# Patient Record
Sex: MALE | Race: WHITE | NOT HISPANIC OR LATINO
[De-identification: names, ages, dates, MRNs, and addresses within clinical notes are randomized per-mention and may not be internally consistent; named-entity substitution may affect disease eponyms.]

---

## 2020-10-31 ENCOUNTER — ERX REFILL RESPONSE (OUTPATIENT)
Age: 60
End: 2020-10-31

## 2020-10-31 RX ORDER — AZATHIOPRINE 50 MG/1
TAKE 4 TABLETS BY MOUTH EVERY DAY TABLET ORAL
Qty: 120 | Refills: 4

## 2020-11-25 ENCOUNTER — TELEPHONE ENCOUNTER (OUTPATIENT)
Dept: URBAN - METROPOLITAN AREA CLINIC 98 | Facility: CLINIC | Age: 60
End: 2020-11-25

## 2020-11-25 RX ORDER — PREDNISONE 5 MG/1
2 TABLETS TABLET ORAL ONCE A DAY
Qty: 60 TABLET | Refills: 5 | OUTPATIENT
Start: 2020-11-25 | End: 2021-05-24

## 2020-11-25 RX ORDER — PREDNISONE 5 MG/1
1 TABLET TABLET ORAL ONCE A DAY
Qty: 30 TABLET | Refills: 5 | OUTPATIENT
Start: 2020-11-25 | End: 2021-05-24

## 2020-11-29 ENCOUNTER — ERX REFILL RESPONSE (OUTPATIENT)
Age: 60
End: 2020-11-29

## 2020-11-29 RX ORDER — PREDNISONE 5 MG/1
TAKE TWO TABLETS BY MOUTH DAILY TABLET ORAL
Qty: 60 | Refills: 4

## 2021-05-04 ENCOUNTER — TELEPHONE ENCOUNTER (OUTPATIENT)
Dept: URBAN - METROPOLITAN AREA CLINIC 98 | Facility: CLINIC | Age: 61
End: 2021-05-04

## 2021-05-04 ENCOUNTER — TELEPHONE ENCOUNTER (OUTPATIENT)
Dept: URBAN - METROPOLITAN AREA CLINIC 92 | Facility: CLINIC | Age: 61
End: 2021-05-04

## 2021-05-04 RX ORDER — SULFASALAZINE 500 MG/1
TAKE FOUR TABLETS BY MOUTH THREE TIMES A DAY FOR 90 DAYS TABLET ORAL
Qty: 1080 | Refills: 3
Start: 2019-06-11

## 2021-05-04 RX ORDER — AZATHIOPRINE 50 MG/1
TAKE 4 TABLETS BY MOUTH EVERY DAY TABLET ORAL
Qty: 120 | Refills: 4

## 2021-05-04 RX ORDER — AZATHIOPRINE 50 MG/1
TAKE 4 TABLETS BY MOUTH EVERY DAY TABLET ORAL ONCE DAILY
Qty: 120 TABLETS | Refills: 5
End: 2021-10-31

## 2021-05-08 ENCOUNTER — ERX REFILL RESPONSE (OUTPATIENT)
Dept: URBAN - METROPOLITAN AREA CLINIC 98 | Facility: CLINIC | Age: 61
End: 2021-05-08

## 2021-05-08 ENCOUNTER — ERX REFILL RESPONSE (OUTPATIENT)
Age: 61
End: 2021-05-08

## 2021-05-08 RX ORDER — AZATHIOPRINE 50 MG/1
TAKE FOUR TABLETS BY MOUTH DAILY TABLET ORAL
Qty: 120 | Refills: 2

## 2021-05-08 RX ORDER — SULFASALAZINE 500 MG/1
TAKE FOUR TABLETS BY MOUTH THREE TIMES A DAY FOR 90 DAYS TABLET ORAL
Qty: 1080 TABLET | Refills: 3 | OUTPATIENT

## 2021-05-08 RX ORDER — SULFASALAZINE 500 MG/1
TAKE FOUR TABLETS BY MOUTH THREE TIMES A DAY FOR 90 DAYS TABLET ORAL
Qty: 1080 | Refills: 3 | OUTPATIENT

## 2021-05-09 ENCOUNTER — ERX REFILL RESPONSE (OUTPATIENT)
Age: 61
End: 2021-05-09

## 2021-05-15 ENCOUNTER — ERX REFILL RESPONSE (OUTPATIENT)
Age: 61
End: 2021-05-15

## 2021-05-22 ENCOUNTER — ERX REFILL RESPONSE (OUTPATIENT)
Age: 61
End: 2021-05-22

## 2021-05-25 ENCOUNTER — TELEPHONE ENCOUNTER (OUTPATIENT)
Dept: URBAN - METROPOLITAN AREA CLINIC 98 | Facility: CLINIC | Age: 61
End: 2021-05-25

## 2021-05-25 ENCOUNTER — OFFICE VISIT (OUTPATIENT)
Dept: URBAN - METROPOLITAN AREA CLINIC 98 | Facility: CLINIC | Age: 61
End: 2021-05-25
Payer: COMMERCIAL

## 2021-05-25 DIAGNOSIS — R19.7 DIARRHEA: ICD-10-CM

## 2021-05-25 DIAGNOSIS — K51.80 CHRONIC PANCOLONIC ULCERATIVE COLITIS: ICD-10-CM

## 2021-05-25 DIAGNOSIS — R10.84 GENERALIZED ABDOMINAL PAIN: ICD-10-CM

## 2021-05-25 DIAGNOSIS — Z79.899 IMMUNOSUPPRESSION DUE TO DRUG THERAPY: ICD-10-CM

## 2021-05-25 PROCEDURE — 99214 OFFICE O/P EST MOD 30 MIN: CPT | Performed by: INTERNAL MEDICINE

## 2021-05-25 RX ORDER — SULFASALAZINE 500 MG/1
TAKE 3 TABLETS TABLET ORAL
Qty: 810 TABLETS | Refills: 3 | OUTPATIENT
Start: 2021-05-25 | End: 2022-05-20

## 2021-05-25 RX ORDER — SULFASALAZINE 500 MG/1
TAKE FOUR TABLETS BY MOUTH THREE TIMES A DAY FOR 90 DAYS TABLET ORAL
Qty: 1080 TABLET | Refills: 3 | Status: ACTIVE | COMMUNITY

## 2021-05-25 RX ORDER — PREDNISONE 5 MG/1
TAKE TWO TABLETS BY MOUTH DAILY TABLET ORAL
Qty: 60 | Refills: 4 | Status: ACTIVE | COMMUNITY

## 2021-05-25 RX ORDER — AZATHIOPRINE 50 MG/1
TAKE FOUR TABLETS BY MOUTH DAILY TABLET ORAL
Qty: 120 | Refills: 2 | Status: ACTIVE | COMMUNITY

## 2021-05-25 RX ORDER — PREDNISONE 1 MG/1
2 TABLETS WITH FOOD OR MILK TABLET ORAL ONCE A DAY
Qty: 60 TABLETS | Refills: 1 | OUTPATIENT
Start: 2021-05-25 | End: 2021-07-24

## 2021-05-25 NOTE — HPI-TODAY'S VISIT:
62 yo male with UC comes in for 1.5 year f/u. No diarrhea bleeding or pain.   Doing well.  Had colonoscopy 2/2020 and no f/u since then.  2/2020 colon showed left sided/desc colon inflammation and focal ulceration desc colon.  On pred 2.5 a day. He feels it keeps him stable. Scared to go off it Occasional takes more than 2.5, less than once a month.  On azathioprine 2 in the am, 1.5 at night. Sulfasalazine 3 bid, was up to 6 bid, but they are hard to find.  No colon for a year./2/2020.   ======================= 11/13/19   History Of Present Illness  This is a scheduled appointment for this patient, a 59 year old /White male, after a previous visit on 11/13/2019, for a follow-up evaluation of Ulcerative colitis.     58 yo male with UC comes in for 3 month f/u.  Was on aza 200 mg q am.  Low wbc and hi 6MMP/nl lft, and changed to 100 mg bid.  Still on 2.5 mg pred a day.  Last time he took 5 mg was 2 months ago with travel.  As a precaution.  No pain, diarrhea or bleeding.  Stools are solid.  Last colonoscopy 6/11/19 and will do Jan Feb 2019.      ====== 8/14/19  58 yo male comes in on aza 200 mg a day.  Not doing too bad.  Gets abd pain non-daily.  On 2.5 mg pred a day and 5 mg if pain.  3 stools a day  Blood infrequent.  Abd pain is better since colonoscopy.  Gas trapping symptoms are better.  Labs from July 2019 are wnl ex borderline low wbc and hi 6 MMP 20,000 with nl lft.  ========== 6/11/19  58 yo male with UC comes in for 1 year f/u.  Now on aza 200 mg a day.  Was on Simponi 1 year ago.  Also tried Xeljanz in past year but it did not help.  Takes prednisone 5 mg a day for a few days and then 2.5 mg a day.  Gets gas in colon and it won't come out.  Gas trapping.    ==== June 2019  59 yo male with UC on Simponi 100 mg q month comes in for q 2 month f/u.  On aza 4 tophp=286 mg a day.  Doing well with no pain , no diarrhea, 4 stools a day, no bleeding.  AGWS.  No fcx.  No EIM ex joint = shoulders.  Stuck on pred 7.5 per day and can' t get below for more than a few day. ========================================= 4/12/18 59 yo male comes in with [place] for this 3 month visit.  Has been on 2.5 mg prednisone for a few months and may have to increase to 5-10 mg for a day or two.  For a month or two skipped the steroids for a month or two.  Saw an orhtopedist who says your bone looks good and said there was a little arthritis on his hip.  He said he was not worried about his shoulders.  He was scared to take any prednisone.  His UC flared up, and he started on the prednisone again.  He took 10 mg a day and  took pred 10 and today 7.5mg.  Since end of month he has been tapering.  Got 3 Simponi from me and getting 2 per month.  Decreased aza from 4 to 3 per day due to wbc being low.  started Simponi inearly 2017  ?? past vedo  Currently 6-7 stools a day No blood Colon 12/17  will try a gradual taper of pred      Past Medical History  Disease Name Date Onset Notes  Abdominal pain 9/8/2017 --   Anemia unk --   Diarrhea 04/12/2018 --   Immunosuppression due to drug therapy 06/16/2019 --   Ulcerative colitis unk --       Past Surgical History  Procedure Name Date Notes  Colonoscopy 12/2017 11/13/2019 - 06/14/2018 - 04/12/2018 -   flexible sigmoidoscopy 2013 11/13/2019 - 04/12/2018 -   Vasectomy unk --       Medication List  Name Date Started Instructions  azathioprine 50 mg oral tablet 06/11/2019 take 4 tablets by oral route qd for 90 days  Cipro 500 mg oral tablet 10/06/2017 TAKE ONE TABLET BY MOUTH TWICE A DAY  prednisone 5 mg oral tablet 07/24/2019 TAKE TWO TABLETS BY MOUTH DAILY  sulfasalazine 500 mg oral tablet 06/11/2019 TAKE FOUR TABLETS BY MOUTH THREE TIMES A DAY for 90 days  Sulfazine  mg oral tablet,delayed release (DR/EC) 10/27/2015 TAKE FOUR TABLETS BY MOUTH THREE TIMES A DAY for 90 days  Uceris 9 mg oral tablet,delayed and ext.release 03/21/2017 take 1 tablet (9 mg) by oral route once daily in the morning swallowing whole with water.  Xeljanz 5 mg oral tablet 06/14/2018 take 2 tablets by oral route 2 times a day for 30 days      Allergy List  Allergen Name Date Reaction Notes  No Known Environmental Allergies --  --  --   No Known Food Allergies --  --  --   PENICILLINS --  --  --       Family Medical History  Disease Name Relative/Age Notes  Family history of diabetes Father/  --       Social History  Finding Status Start/Stop Quantity Notes  Alcohol Never --/-- --  11/13/2019 - 08/14/2019 - 06/06/2019 - 06/14/2018 - 04/12/2018 - 01/12/2018 - 09/08/2017 - 07/07/2017 - 03/06/2017 -   Denies illicit substance abuse --  --/-- --  11/13/2019 -   Tobacco Former --/-- --  11/13/2019 - 06/06/2019 - 06/14/2018 - 04/12/2018 - 01/12/2018 - 09/08/2017 - 07/07/2017 - 03/06/2017 - 09/02/2016 -       Review of Systems  ConstitutionalDenies : body aches, chills, fatigue, fever, loss of appetite, malaise, night sweats, weight gain, weight loss  EyesDenies : blurred vision, visual changes  HENTDenies : Ear Pain/Ringing, hearing loss, Mouth Ulcers/Sores, nose bleeds, problems with gums/teeth, trouble swallowing  CardiovascularDenies : chest pain, leaky heart valves, heart murmur, heart racing/skipping, high blood pressure, palpitations  RespiratoryDenies : chronic cough, shortness of breath, wheezing or asthma symptoms  GastrointestinalDenies : Abdominal Pain/discomfort, Anal/Rectal Pain or Itching, Anal Spasm, Black Stool, Bloating/belching/gaseouness, Change of Bowel Habit, Constipation, Diarrhea/Loose Stool, Difficulty in Swallowing, Heartburn/esophageal reflux, Hemorrhoids, Indigestion, Mucus in Stool, Nausea/vomiting, Rectal Bleeding, Unintentional Weight Loss  GenitourinaryDenies : Blood in Urine, Burning/pain with Urination, frequency, Recent/frequent UTI, Kidney Stones  IntegumentDenies : itching/dry skin, jaundice, rashes, bumps or sores  NeurologicDenies : headaches, dizziness/vertigo, head trauma/injury, recent numbness/weakness, seizures  MusculoskeletalDenies : back pain, decreased range of motion, joint pain/arthritis, Problems Walking/calf or leg pain  EndocrineDenies : bruise easily, excessive thirst, heat/cold intolerance, history of high or low blood sugar  PsychiatricDenies : anxiety, changes in sleep pattern, depression, loss of memory  Heme-LymphDenies : Bleeding Problems, Enlarged Nodes/Swolen Glands, excessive bruising, history of anemia  Allergic-ImmunologicDenies : seasonal allergies    Vitals  Date Time BP Position Site L\R Cuff Size HR RR TEMP (F) WT  HT  BMI kg/m2 BSA m2 O2 Sat HC     11/13/2019 03:26 /83 Sitting    72 - R  97.4 181lbs 2oz 5'  8" 27.54 1.99        Physical Examination  ConstitutionalAppearance : Well nourished, well developed patient in no distress. Ambulating without difficulty.  Ability to Communicate : Normal communication ability  EyesConjunctivae and Eyelids : Conjunctivae and eyelids appear normal  Sclerae : White without injection  HENTHead :  Inspection : Normocephalic and atraumatic  Face :  Inspection : Face within normal limits  Ears :  External Ears : External ears within normal limits  Nose/Nasopharynx :  External Nose : External nose normal appearance, nares patent, no nasal discharge  Mouth and Throat :  General : Oral cavity appearance normal, breath odor normal  Lips : Appearance normal  NeckInspection and Palpation : Normal appearance without tenderness on palpation or deformities, trachea midline  Thyroid : Normal size without tenderness, nodules or masses  Jugular Veins : no JVD  ChestInspection of Chest : No lesions, deformities or traumatic injuries present. No significant scars are present.  Respiratory Effort : Breathing is unlabored without accessory muscle use  Palpation of Chest : Chest wall non-tender with no masses present. Tactile fremitus is normal  Auscultation : Normal breath sounds  CardiovascularHeart :  Auscultation : Heart rate is regular with normal rhythm. No murmurs are heard. No edema.  GastrointestinalAbdominal Exam : Abdomen soft without rigidity or guarding, abdomen non-tender to palpation, normal bowel sounds, no masses present, non-distended  Liver and Spleen : No hepatomegaly present. Liver is non-tender to palpation and spleen is not palpable.  LymphaticNeck : No lymphadenopathy present  Axillae : No lymphadenopathy present  Groin : No lymphadenopathy present  MusculoskeletalGait and Station : Normal Gait, normal station  Neck/Spine/Pelvis : No tenderness of deformities present.  SkinGeneral Inspection : No rashes, lesions or areas of discoloration present. Skin turgor is normal.  General Palpation : No abnormalities, masses or tenderness on palpation.  Neurologic and PsychiatricOrientation : Oriented to person, place and time  Sensation : Normal sensation  Memory : Short and long term memory intact.  Mood and Affect : Normal mood with an appropriate affect      Assessment  Ulcerative colitis     556.6  Immunosuppression due to drug therapy     V58.69/Z79.899  Abdominal pain     789.00/R10.9    Plan  OrdersPatient not identified as an unhealthy alcohol user when screene () - - 11/13/2019  Influenza immunization administered or previously received () - - 11/13/2019  BMI is documented within normal parameters and no follow-up plan () - - 11/13/2019  Current tobacco non-user (CAD, cap, COPD, PV) (dm) (IBD) (1036F, ) - - 11/13/2019  Colorectal cancer screening results documented and reviewed (PV) (3017F) - - 11/13/2019  Documentation of current medications. () - - 11/13/2019  CMP (comprehensive metabolic panel) (10018) - - 11/13/2019  Sed rate (79344) - - 11/13/2019  C-reactive protein (90241) - - 11/13/2019  CBC with diff (47716) - - 11/13/2019  Thiopurine metabolites measurement. (54966, 70094) - - 11/13/2019  MedicationsMedications have been Reconciled  Transition of Care or Provider Policy  InstructionsI have documented a list of current medications and reviewed it with the patient.  I encouraged the patient to diet and exercise.  Electronically Identified Patient Education Materials Provided Electronically  DispositionReturn To Clinic in 6 Months  CorrespondenceCC this document (Arturo Velázquez MD) - 11/13/2019    Try stopping pred or taper by 1 mg  consdier uceris if needed.  ck labs and thiopurines.  Colonoscopy in 2-3 months.  he mentions on antibiotic "clindamycin" for "wound" and take a probiotic  Gave him 12 days of bid vsl3 probiotic  Addendum--11/25/19 6TG 405 and 6MMP of 47445 Will reduce aza from 100 bid to 100 in am and 75 in pm and recheck labs in 1 month         Electronically Signed by: Gerardo Zabala MD -Author on November 25, 2019 02:15:43 AM

## 2021-05-26 ENCOUNTER — ERX REFILL RESPONSE (OUTPATIENT)
Age: 61
End: 2021-05-26

## 2021-05-29 ENCOUNTER — ERX REFILL RESPONSE (OUTPATIENT)
Age: 61
End: 2021-05-29

## 2021-05-29 RX ORDER — SULFASALAZINE 500 MG/1
TAKE 3 TABLETS 3 TIMES A DAY TABLET ORAL
Qty: 810 | Refills: 12 | OUTPATIENT

## 2021-05-29 RX ORDER — SULFASALAZINE 500 MG/1
TAKE 3 TABLETS 3 TIMES A DAY TABLET ORAL
Qty: 810 TABLET | Refills: 12 | OUTPATIENT

## 2021-06-08 LAB
6-MMPN: 9841
6-TGN: 244
A/G RATIO: 1.6
ALBUMIN: 4.2
ALKALINE PHOSPHATASE: 66
ALT (SGPT): 8
AST (SGOT): 22
BASO (ABSOLUTE): 0
BASOS: 1
BILIRUBIN, TOTAL: 0.6
BUN/CREATININE RATIO: 19
BUN: 16
C-REACTIVE PROTEIN, QUANT: 1
CALCIUM: 9.2
CARBON DIOXIDE, TOTAL: 26
CHLORIDE: 105
CREATININE: 0.85
EGFR IF AFRICN AM: 107
EGFR IF NONAFRICN AM: 93
EOS (ABSOLUTE): 0
EOS: 1
FERRITIN, SERUM: 69
FOLATE (FOLIC ACID), SERUM: >20
GLOBULIN, TOTAL: 2.6
GLUCOSE: 92
HEMATOCRIT: 48.1
HEMATOLOGY COMMENTS:: (no result)
HEMOGLOBIN: 15.7
IMMATURE CELLS: (no result)
IMMATURE GRANS (ABS): 0
IMMATURE GRANULOCYTES: 1
IRON BIND.CAP.(TIBC): 282
IRON SATURATION: 43
IRON: 120
LYMPHS (ABSOLUTE): 0.8
LYMPHS: 23
MCH: 31.6
MCHC: 32.6
MCV: 97
MONOCYTES(ABSOLUTE): 0.5
MONOCYTES: 14
NEUTROPHILS (ABSOLUTE): 2.2
NEUTROPHILS: 60
NRBC: (no result)
PLATELETS: 213
POTASSIUM: 4.2
PROTEIN, TOTAL: 6.8
RBC: 4.97
RDW: 14.2
SEDIMENTATION RATE-WESTERGREN: 15
SODIUM: 145
UIBC: 162
VITAMIN B12: 275
VITAMIN D, 25-HYDROXY: 24.7
WBC: 3.7

## 2021-06-10 ENCOUNTER — TELEPHONE ENCOUNTER (OUTPATIENT)
Dept: URBAN - METROPOLITAN AREA CLINIC 98 | Facility: CLINIC | Age: 61
End: 2021-06-10

## 2021-06-10 RX ORDER — ERGOCALCIFEROL 1.25 MG/1
1 CAPSULE CAPSULE, LIQUID FILLED ORAL
Qty: 4 CAPSULES | Refills: 5 | OUTPATIENT
Start: 2021-06-10 | End: 2021-12-06

## 2021-08-07 ENCOUNTER — ERX REFILL RESPONSE (OUTPATIENT)
Dept: URBAN - METROPOLITAN AREA CLINIC 98 | Facility: CLINIC | Age: 61
End: 2021-08-07

## 2021-08-07 RX ORDER — PREDNISONE 1 MG/1
TAKE 2 TABLETS WIH FOOD OR MILK ONCE A DAY***MAY TAPER BY 1/2 TABLET PER MONTH** TABLET ORAL
Qty: 60 TABLET | Refills: 2 | OUTPATIENT

## 2021-08-11 ENCOUNTER — TELEPHONE ENCOUNTER (OUTPATIENT)
Dept: URBAN - METROPOLITAN AREA CLINIC 98 | Facility: CLINIC | Age: 61
End: 2021-08-11

## 2021-08-11 RX ORDER — PREDNISONE 1 MG/1
2 TABLETS WITH FOOD OR MILK TABLET ORAL ONCE A DAY
Qty: 60 TABLETS | Refills: 5 | OUTPATIENT
Start: 2021-08-11 | End: 2022-02-06

## 2021-09-18 ENCOUNTER — ERX REFILL RESPONSE (OUTPATIENT)
Dept: URBAN - METROPOLITAN AREA CLINIC 98 | Facility: CLINIC | Age: 61
End: 2021-09-18

## 2021-09-18 RX ORDER — AZATHIOPRINE 50 MG/1
TAKE 4 TABLETS BY MOUTH EVERY DAY ONCE DAILY FOR 30 DAYS TABLET ORAL
Qty: 120 TABLET | Refills: 6 | OUTPATIENT

## 2021-09-18 RX ORDER — AZATHIOPRINE 50 MG/1
TAKE FOUR TABLETS BY MOUTH DAILY TABLET ORAL
Qty: 120 | Refills: 2 | OUTPATIENT

## 2021-10-09 ENCOUNTER — ERX REFILL RESPONSE (OUTPATIENT)
Dept: URBAN - METROPOLITAN AREA CLINIC 98 | Facility: CLINIC | Age: 61
End: 2021-10-09

## 2021-10-09 RX ORDER — PREDNISONE 1 MG/1
TAKE 2 TABLETS WIH FOOD OR MILK ONCE A DAY***MAY TAPER BY 1/2 TABLET PER MONTH** TABLET ORAL
Qty: 60 TABLET | Refills: 2 | OUTPATIENT

## 2021-10-09 RX ORDER — PREDNISONE 1 MG/1
TAKE 2 TABLETS WIH FOOD OR MILK ONCE A DAY***MAY TAPER BY 1/2 TABLET PER MONTH** 30 TABLET ORAL
Qty: 60 TABLET | Refills: 2 | OUTPATIENT

## 2021-11-30 ENCOUNTER — TELEPHONE ENCOUNTER (OUTPATIENT)
Dept: URBAN - METROPOLITAN AREA CLINIC 98 | Facility: CLINIC | Age: 61
End: 2021-11-30

## 2021-11-30 RX ORDER — PREDNISONE 1 MG/1
2 TABLETS WITH FOOD OR MILK TABLET ORAL ONCE A DAY
Qty: 60 TABLETS | Refills: 5 | OUTPATIENT
Start: 2021-11-30 | End: 2022-05-29

## 2022-01-13 ENCOUNTER — ERX REFILL RESPONSE (OUTPATIENT)
Dept: URBAN - METROPOLITAN AREA CLINIC 98 | Facility: CLINIC | Age: 62
End: 2022-01-13

## 2022-01-13 RX ORDER — PREDNISONE 1 MG/1
TAKE 2 TABLETS WIH FOOD OR MILK ONCE A DAY***MAY TAPER BY 1/2 TABLET PER MONTH** 30 TABLET ORAL
Qty: 60 TABLET | Refills: 2 | OUTPATIENT

## 2022-01-13 RX ORDER — PREDNISONE 1 MG/1
TAKE 2 TABLETS WIH FOOD OR MILK ONCE A DAY***MAY TAPER BY 1/2 TABLET PER MONTH** 30 TABLET ORAL
Qty: 60 TABLET | Refills: 1 | OUTPATIENT

## 2022-01-13 RX ORDER — AZATHIOPRINE 50 MG/1
TAKE 4 TABLETS BY MOUTH EVERY DAY ONCE DAILY FOR 30 DAYS TABLET ORAL
Qty: 360 TABLET | Refills: 2 | OUTPATIENT

## 2022-04-29 ENCOUNTER — OFFICE VISIT (OUTPATIENT)
Dept: URBAN - METROPOLITAN AREA CLINIC 98 | Facility: CLINIC | Age: 62
End: 2022-04-29

## 2022-05-12 ENCOUNTER — WEB ENCOUNTER (OUTPATIENT)
Dept: URBAN - METROPOLITAN AREA CLINIC 98 | Facility: CLINIC | Age: 62
End: 2022-05-12

## 2022-05-12 ENCOUNTER — OFFICE VISIT (OUTPATIENT)
Dept: URBAN - METROPOLITAN AREA CLINIC 98 | Facility: CLINIC | Age: 62
End: 2022-05-12
Payer: COMMERCIAL

## 2022-05-12 DIAGNOSIS — K51.80 CHRONIC PANCOLONIC ULCERATIVE COLITIS: ICD-10-CM

## 2022-05-12 DIAGNOSIS — R19.7 DIARRHEA: ICD-10-CM

## 2022-05-12 DIAGNOSIS — R10.84 ABDOMINAL CRAMPING, GENERALIZED: ICD-10-CM

## 2022-05-12 DIAGNOSIS — Z79.899 IMMUNOSUPPRESSION DUE TO DRUG THERAPY: ICD-10-CM

## 2022-05-12 PROCEDURE — 99214 OFFICE O/P EST MOD 30 MIN: CPT | Performed by: INTERNAL MEDICINE

## 2022-05-12 RX ORDER — SULFASALAZINE 500 MG/1
TAKE 3 TABLETS 3 TIMES A DAY TABLET ORAL
Qty: 810 TABLET | Refills: 12 | Status: ACTIVE | COMMUNITY

## 2022-05-12 RX ORDER — PREDNISONE 5 MG/1
TAKE TWO TABLETS BY MOUTH DAILY TABLET ORAL
Qty: 60 | Refills: 4 | Status: ACTIVE | COMMUNITY

## 2022-05-12 RX ORDER — PREDNISONE 1 MG/1
TAKE 2 TABLETS WIH FOOD OR MILK ONCE A DAY***MAY TAPER BY 1/2 TABLET PER MONTH** 30 TABLET ORAL
Qty: 60 TABLET | Refills: 1 | Status: ACTIVE | COMMUNITY

## 2022-05-12 RX ORDER — AZATHIOPRINE 50 MG/1
TAKE 4 TABLETS BY MOUTH EVERY DAY ONCE DAILY FOR 30 DAYS TABLET ORAL
Qty: 360 TABLET | Refills: 2 | Status: ACTIVE | COMMUNITY

## 2022-05-12 NOTE — PHYSICAL EXAM HENT:
From: Jase Mora  To: Ant Cardenas NIKI Germain MD]    Preferred pharmacy: [place] [id number], [address] [facility].     Comment: Head, normocephalic, atraumatic, Face, Face within normal limits

## 2022-05-12 NOTE — HPI-TODAY'S VISIT:
63 yo male doing well for UC. 1 year f/u. Takes 1 mg prednison and may go up to 2 mg a day. No ER visits. 2 doses Covid vax.     ========================= 5/25/21  62 yo male with UC comes in for 1.5 year f/u. No diarrhea bleeding or pain.   Doing well.  Had colonoscopy 2/2020 and no f/u since then.  2/2020 colon showed left sided/desc colon inflammation and focal ulceration desc colon.  On pred 2.5 a day. He feels it keeps him stable. Scared to go off it Occasional takes more than 2.5, less than once a month.  On azathioprine 2 in the am, 1.5 at night. Sulfasalazine 3 bid, was up to 6 bid, but they are hard to find.  No colon for a year./2/2020.   ======================= 11/13/19   History Of Present Illness  This is a scheduled appointment for this patient, a 59 year old /White male, after a previous visit on 11/13/2019, for a follow-up evaluation of Ulcerative colitis.     60 yo male with UC comes in for 3 month f/u.  Was on aza 200 mg q am.  Low wbc and hi 6MMP/nl lft, and changed to 100 mg bid.  Still on 2.5 mg pred a day.  Last time he took 5 mg was 2 months ago with travel.  As a precaution.  No pain, diarrhea or bleeding.  Stools are solid.  Last colonoscopy 6/11/19 and will do Jan Feb 2019.      ====== 8/14/19  60 yo male comes in on aza 200 mg a day.  Not doing too bad.  Gets abd pain non-daily.  On 2.5 mg pred a day and 5 mg if pain.  3 stools a day  Blood infrequent.  Abd pain is better since colonoscopy.  Gas trapping symptoms are better.  Labs from July 2019 are wnl ex borderline low wbc and hi 6 MMP 20,000 with nl lft.  ========== 6/11/19  60 yo male with UC comes in for 1 year f/u.  Now on aza 200 mg a day.  Was on Simponi 1 year ago.  Also tried Xeljanz in past year but it did not help.  Takes prednisone 5 mg a day for a few days and then 2.5 mg a day.  Gets gas in colon and it won't come out.  Gas trapping.    ==== June 2019  57 yo male with UC on Simponi 100 mg q month comes in for q 2 month f/u.  On aza 4 lafyz=126 mg a day.  Doing well with no pain , no diarrhea, 4 stools a day, no bleeding.  AGWS.  No fcx.  No EIM ex joint = shoulders.  Stuck on pred 7.5 per day and can' t get below for more than a few day. ========================================= 4/12/18 57 yo male comes in with [place] for this 3 month visit.  Has been on 2.5 mg prednisone for a few months and may have to increase to 5-10 mg for a day or two.  For a month or two skipped the steroids for a month or two.  Saw an orhtopedist who says your bone looks good and said there was a little arthritis on his hip.  He said he was not worried about his shoulders.  He was scared to take any prednisone.  His UC flared up, and he started on the prednisone again.  He took 10 mg a day and  took pred 10 and today 7.5mg.  Since end of month he has been tapering.  Got 3 Simponi from me and getting 2 per month.  Decreased aza from 4 to 3 per day due to wbc being low.  started Simponi inearly 2017  ?? past vedo  Currently 6-7 stools a day No blood Colon 12/17  will try a gradual taper of pred      Past Medical History  Disease Name Date Onset Notes  Abdominal pain 9/8/2017 --   Anemia unk --   Diarrhea 04/12/2018 --   Immunosuppression due to drug therapy 06/16/2019 --   Ulcerative colitis unk --       Past Surgical History  Procedure Name Date Notes  Colonoscopy 12/2017 11/13/2019 - 06/14/2018 - 04/12/2018 -   flexible sigmoidoscopy 2013 11/13/2019 - 04/12/2018 -   Vasectomy unk --       Medication List  Name Date Started Instructions  azathioprine 50 mg oral tablet 06/11/2019 take 4 tablets by oral route qd for 90 days  Cipro 500 mg oral tablet 10/06/2017 TAKE ONE TABLET BY MOUTH TWICE A DAY  prednisone 5 mg oral tablet 07/24/2019 TAKE TWO TABLETS BY MOUTH DAILY  sulfasalazine 500 mg oral tablet 06/11/2019 TAKE FOUR TABLETS BY MOUTH THREE TIMES A DAY for 90 days  Sulfazine  mg oral tablet,delayed release (DR/EC) 10/27/2015 TAKE FOUR TABLETS BY MOUTH THREE TIMES A DAY for 90 days  Uceris 9 mg oral tablet,delayed and ext.release 03/21/2017 take 1 tablet (9 mg) by oral route once daily in the morning swallowing whole with water.  Xeljanz 5 mg oral tablet 06/14/2018 take 2 tablets by oral route 2 times a day for 30 days      Allergy List  Allergen Name Date Reaction Notes  No Known Environmental Allergies --  --  --   No Known Food Allergies --  --  --   PENICILLINS --  --  --       Family Medical History  Disease Name Relative/Age Notes  Family history of diabetes Father/  --       Social History  Finding Status Start/Stop Quantity Notes  Alcohol Never --/-- --  11/13/2019 - 08/14/2019 - 06/06/2019 - 06/14/2018 - 04/12/2018 - 01/12/2018 - 09/08/2017 - 07/07/2017 - 03/06/2017 -   Denies illicit substance abuse --  --/-- --  11/13/2019 -   Tobacco Former --/-- --  11/13/2019 - 06/06/2019 - 06/14/2018 - 04/12/2018 - 01/12/2018 - 09/08/2017 - 07/07/2017 - 03/06/2017 - 09/02/2016 -       Review of Systems  ConstitutionalDenies : body aches, chills, fatigue, fever, loss of appetite, malaise, night sweats, weight gain, weight loss  EyesDenies : blurred vision, visual changes  HENTDenies : Ear Pain/Ringing, hearing loss, Mouth Ulcers/Sores, nose bleeds, problems with gums/teeth, trouble swallowing  CardiovascularDenies : chest pain, leaky heart valves, heart murmur, heart racing/skipping, high blood pressure, palpitations  RespiratoryDenies : chronic cough, shortness of breath, wheezing or asthma symptoms  GastrointestinalDenies : Abdominal Pain/discomfort, Anal/Rectal Pain or Itching, Anal Spasm, Black Stool, Bloating/belching/gaseouness, Change of Bowel Habit, Constipation, Diarrhea/Loose Stool, Difficulty in Swallowing, Heartburn/esophageal reflux, Hemorrhoids, Indigestion, Mucus in Stool, Nausea/vomiting, Rectal Bleeding, Unintentional Weight Loss  GenitourinaryDenies : Blood in Urine, Burning/pain with Urination, frequency, Recent/frequent UTI, Kidney Stones  IntegumentDenies : itching/dry skin, jaundice, rashes, bumps or sores  NeurologicDenies : headaches, dizziness/vertigo, head trauma/injury, recent numbness/weakness, seizures  MusculoskeletalDenies : back pain, decreased range of motion, joint pain/arthritis, Problems Walking/calf or leg pain  EndocrineDenies : bruise easily, excessive thirst, heat/cold intolerance, history of high or low blood sugar  PsychiatricDenies : anxiety, changes in sleep pattern, depression, loss of memory  Heme-LymphDenies : Bleeding Problems, Enlarged Nodes/Swolen Glands, excessive bruising, history of anemia  Allergic-ImmunologicDenies : seasonal allergies    Vitals  Date Time BP Position Site L\R Cuff Size HR RR TEMP (F) WT  HT  BMI kg/m2 BSA m2 O2 Sat      11/13/2019 03:26 /83 Sitting    72 - R  97.4 181lbs 2oz 5'  8" 27.54 1.99        Physical Examination  ConstitutionalAppearance : Well nourished, well developed patient in no distress. Ambulating without difficulty.  Ability to Communicate : Normal communication ability  EyesConjunctivae and Eyelids : Conjunctivae and eyelids appear normal  Sclerae : White without injection  HENTHead :  Inspection : Normocephalic and atraumatic  Face :  Inspection : Face within normal limits  Ears :  External Ears : External ears within normal limits  Nose/Nasopharynx :  External Nose : External nose normal appearance, nares patent, no nasal discharge  Mouth and Throat :  General : Oral cavity appearance normal, breath odor normal  Lips : Appearance normal  NeckInspection and Palpation : Normal appearance without tenderness on palpation or deformities, trachea midline  Thyroid : Normal size without tenderness, nodules or masses  Jugular Veins : no JVD  ChestInspection of Chest : No lesions, deformities or traumatic injuries present. No significant scars are present.  Respiratory Effort : Breathing is unlabored without accessory muscle use  Palpation of Chest : Chest wall non-tender with no masses present. Tactile fremitus is normal  Auscultation : Normal breath sounds  CardiovascularHeart :  Auscultation : Heart rate is regular with normal rhythm. No murmurs are heard. No edema.  GastrointestinalAbdominal Exam : Abdomen soft without rigidity or guarding, abdomen non-tender to palpation, normal bowel sounds, no masses present, non-distended  Liver and Spleen : No hepatomegaly present. Liver is non-tender to palpation and spleen is not palpable.  LymphaticNeck : No lymphadenopathy present  Axillae : No lymphadenopathy present  Groin : No lymphadenopathy present  MusculoskeletalGait and Station : Normal Gait, normal station  Neck/Spine/Pelvis : No tenderness of deformities present.  SkinGeneral Inspection : No rashes, lesions or areas of discoloration present. Skin turgor is normal.  General Palpation : No abnormalities, masses or tenderness on palpation.  Neurologic and PsychiatricOrientation : Oriented to person, place and time  Sensation : Normal sensation  Memory : Short and long term memory intact.  Mood and Affect : Normal mood with an appropriate affect      Assessment  Ulcerative colitis     556.6  Immunosuppression due to drug therapy     V58.69/Z79.899  Abdominal pain     789.00/R10.9    Plan  OrdersPatient not identified as an unhealthy alcohol user when screene () - - 11/13/2019  Influenza immunization administered or previously received () - - 11/13/2019  BMI is documented within normal parameters and no follow-up plan () - - 11/13/2019  Current tobacco non-user (CAD, cap, COPD, PV) (dm) (IBD) (1036F, ) - - 11/13/2019  Colorectal cancer screening results documented and reviewed (PV) (3017F) - - 11/13/2019  Documentation of current medications. () - - 11/13/2019  CMP (comprehensive metabolic panel) (77042) - - 11/13/2019  Sed rate (76029) - - 11/13/2019  C-reactive protein (06077) - - 11/13/2019  CBC with diff (66365) - - 11/13/2019  Thiopurine metabolites measurement. (87575, 52479) - - 11/13/2019  MedicationsMedications have been Reconciled  Transition of Care or Provider Policy  InstructionsI have documented a list of current medications and reviewed it with the patient.  I encouraged the patient to diet and exercise.  Electronically Identified Patient Education Materials Provided Electronically  DispositionReturn To Clinic in 6 Months  CorrespondenceCC this document (Arturo Velázquez MD) - 11/13/2019    Try stopping pred or taper by 1 mg  consdier uceris if needed.  ck labs and thiopurines.  Colonoscopy in 2-3 months.  he mentions on antibiotic "clindamycin" for "wound" and take a probiotic  Gave him 12 days of bid vsl3 probiotic  Addendum--11/25/19 6TG 405 and 6MMP of 22530 Will reduce aza from 100 bid to 100 in am and 75 in pm and recheck labs in 1 month         Electronically Signed by: Gerardo Zabala MD -Author on November 25, 2019 02:15:43 AM Waking up in the middle of the night severe pain in his feet. Once or twice a month. Has been going on for four months.

## 2022-05-13 LAB
A/G RATIO: 1.9
ALBUMIN: 4.3
ALKALINE PHOSPHATASE: 65
ALT (SGPT): 11
AST (SGOT): 20
BASO (ABSOLUTE): 0
BASOS: 1
BILIRUBIN, TOTAL: 0.7
BUN/CREATININE RATIO: 19
BUN: 15
C-REACTIVE PROTEIN, QUANT: <1
CALCIUM: 9.5
CARBON DIOXIDE, TOTAL: 26
CHLORIDE: 103
CREATININE: 0.78
EGFR: 100
EOS (ABSOLUTE): 0.1
EOS: 2
FERRITIN, SERUM: 76
FOLATE (FOLIC ACID), SERUM: >20
GLOBULIN, TOTAL: 2.3
GLUCOSE: 101
HEMATOCRIT: 49.1
HEMATOLOGY COMMENTS:: (no result)
HEMOGLOBIN: 15.9
IMMATURE CELLS: (no result)
IMMATURE GRANS (ABS): 0
IMMATURE GRANULOCYTES: 1
IRON BIND.CAP.(TIBC): 307
IRON SATURATION: 41
IRON: 125
LYMPHS (ABSOLUTE): 0.7
LYMPHS: 19
MCH: 31.2
MCHC: 32.4
MCV: 96
MONOCYTES(ABSOLUTE): 0.6
MONOCYTES: 15
NEUTROPHILS (ABSOLUTE): 2.3
NEUTROPHILS: 62
NRBC: (no result)
PLATELETS: 197
POTASSIUM: 4.6
PROTEIN, TOTAL: 6.6
RBC: 5.1
RDW: 13.3
SEDIMENTATION RATE-WESTERGREN: 12
SODIUM: 142
UIBC: 182
VITAMIN B12: 336
VITAMIN D, 25-HYDROXY: 26.4
WBC: 3.6

## 2022-05-16 ENCOUNTER — TELEPHONE ENCOUNTER (OUTPATIENT)
Dept: URBAN - METROPOLITAN AREA CLINIC 98 | Facility: CLINIC | Age: 62
End: 2022-05-16

## 2022-05-16 RX ORDER — ERGOCALCIFEROL 1.25 MG/1
1 CAPSULE CAPSULE, LIQUID FILLED ORAL
Qty: 4 CAPSULES | Refills: 5 | OUTPATIENT
Start: 2022-05-16 | End: 2022-11-11

## 2022-05-19 ENCOUNTER — TELEPHONE ENCOUNTER (OUTPATIENT)
Dept: URBAN - METROPOLITAN AREA CLINIC 98 | Facility: CLINIC | Age: 62
End: 2022-05-19

## 2022-05-19 LAB
CALPROTECTIN, FECAL: 36
LACTOFERRIN, FECAL, QUANT.: 2.4

## 2022-05-19 RX ORDER — AZATHIOPRINE 50 MG/1
TAKE 4 TABLETS BY MOUTH EVERY DAY ONCE DAILY FOR 90 DAYS TABLET ORAL
Qty: 360 TABLETS | Refills: 1

## 2022-05-21 ENCOUNTER — ERX REFILL RESPONSE (OUTPATIENT)
Dept: URBAN - METROPOLITAN AREA CLINIC 98 | Facility: CLINIC | Age: 62
End: 2022-05-21

## 2022-05-21 RX ORDER — AZATHIOPRINE 50 1/1
TAKE FOUR TABLETS BY MOUTH DAILY AS DIRECTED TABLET ORAL
Qty: 120 TABLET | Refills: 1 | OUTPATIENT

## 2022-05-21 RX ORDER — AZATHIOPRINE 50 MG/1
TAKE 4 TABLETS BY MOUTH EVERY DAY ONCE DAILY FOR 90 DAYS TABLET ORAL
Qty: 360 TABLETS | Refills: 1 | OUTPATIENT

## 2022-05-21 RX ORDER — AZATHIOPRINE 50 MG/1
TAKE 4 TABLETS BY MOUTH DAILY TABLET ORAL
Qty: 120 TABLET | Refills: 1 | OUTPATIENT

## 2022-06-15 ENCOUNTER — P2P PATIENT RECORD (OUTPATIENT)
Age: 62
End: 2022-06-15

## 2022-07-26 ENCOUNTER — OFFICE VISIT (OUTPATIENT)
Dept: URBAN - METROPOLITAN AREA SURGERY CENTER 18 | Facility: SURGERY CENTER | Age: 62
End: 2022-07-26
Payer: COMMERCIAL

## 2022-07-26 ENCOUNTER — CLAIMS CREATED FROM THE CLAIM WINDOW (OUTPATIENT)
Dept: URBAN - METROPOLITAN AREA CLINIC 4 | Facility: CLINIC | Age: 62
End: 2022-07-26
Payer: COMMERCIAL

## 2022-07-26 DIAGNOSIS — K63.89 OTHER SPECIFIED DISEASES OF INTESTINE: ICD-10-CM

## 2022-07-26 DIAGNOSIS — K51.80 OTHER ULCERATIVE COLITIS WITHOUT COMPLICATIONS: ICD-10-CM

## 2022-07-26 DIAGNOSIS — D12.8 ADENOMATOUS POLYP OF RECTUM: ICD-10-CM

## 2022-07-26 DIAGNOSIS — D12.8 BENIGN NEOPLASM OF RECTUM: ICD-10-CM

## 2022-07-26 DIAGNOSIS — K51.00 ACUTE ULCERATIVE PANCOLITIS: ICD-10-CM

## 2022-07-26 PROCEDURE — 88305 TISSUE EXAM BY PATHOLOGIST: CPT | Performed by: PATHOLOGY

## 2022-07-26 PROCEDURE — 45380 COLONOSCOPY AND BIOPSY: CPT | Performed by: INTERNAL MEDICINE

## 2022-07-26 PROCEDURE — G8907 PT DOC NO EVENTS ON DISCHARG: HCPCS | Performed by: INTERNAL MEDICINE

## 2022-07-26 RX ORDER — ERGOCALCIFEROL 1.25 MG/1
1 CAPSULE CAPSULE, LIQUID FILLED ORAL
Qty: 4 CAPSULES | Refills: 5 | Status: ACTIVE | COMMUNITY
Start: 2022-05-16 | End: 2022-11-11

## 2022-07-26 RX ORDER — SULFASALAZINE 500 MG/1
TAKE 3 TABLETS 3 TIMES A DAY TABLET ORAL
Qty: 810 TABLET | Refills: 12 | Status: ACTIVE | COMMUNITY

## 2022-07-26 RX ORDER — PREDNISONE 5 MG/1
TAKE TWO TABLETS BY MOUTH DAILY TABLET ORAL
Qty: 60 | Refills: 4 | Status: ACTIVE | COMMUNITY

## 2022-07-26 RX ORDER — AZATHIOPRINE 50 1/1
TAKE FOUR TABLETS BY MOUTH DAILY AS DIRECTED TABLET ORAL
Qty: 120 TABLET | Refills: 1 | Status: ACTIVE | COMMUNITY

## 2022-07-26 RX ORDER — PREDNISONE 1 MG/1
TAKE 2 TABLETS WIH FOOD OR MILK ONCE A DAY***MAY TAPER BY 1/2 TABLET PER MONTH** 30 TABLET ORAL
Qty: 60 TABLET | Refills: 1 | Status: ACTIVE | COMMUNITY

## 2022-09-19 ENCOUNTER — ERX REFILL RESPONSE (OUTPATIENT)
Dept: URBAN - METROPOLITAN AREA CLINIC 98 | Facility: CLINIC | Age: 62
End: 2022-09-19

## 2022-09-19 RX ORDER — PREDNISONE 1 MG/1
TAKE 2 TABLETS BY MOUTH WITH FOOD OR MILK DAILY TABLET ORAL
Qty: 60 TABLET | Refills: 0 | OUTPATIENT

## 2022-09-19 RX ORDER — PREDNISONE 1 MG/1
TAKE 2 TABLETS WIH FOOD OR MILK ONCE A DAY***MAY TAPER BY 1/2 TABLET PER MONTH** 30 TABLET ORAL
Qty: 60 TABLET | Refills: 1 | OUTPATIENT

## 2022-11-16 ENCOUNTER — TELEPHONE ENCOUNTER (OUTPATIENT)
Dept: URBAN - METROPOLITAN AREA CLINIC 98 | Facility: CLINIC | Age: 62
End: 2022-11-16

## 2022-11-16 RX ORDER — AZATHIOPRINE 50 1/1
TAKE FOUR TABLETS BY MOUTH DAILY AS DIRECTED TABLET ORAL
Qty: 120 TABLET | Refills: 5

## 2022-11-16 RX ORDER — PREDNISONE 1 MG/1
TAKE 2 TABLETS BY MOUTH WITH FOOD OR MILK DAILY TABLET ORAL
Qty: 60 TABLET | Refills: 5

## 2022-11-16 RX ORDER — SULFASALAZINE 500 MG/1
TAKE 3 TABLETS 3 TIMES A DAY TABLET ORAL
Qty: 270 TABLETS | Refills: 5

## 2022-12-19 ENCOUNTER — OFFICE VISIT (OUTPATIENT)
Dept: URBAN - METROPOLITAN AREA CLINIC 98 | Facility: CLINIC | Age: 62
End: 2022-12-19
Payer: COMMERCIAL

## 2022-12-19 ENCOUNTER — ERX REFILL RESPONSE (OUTPATIENT)
Dept: URBAN - METROPOLITAN AREA CLINIC 98 | Facility: CLINIC | Age: 62
End: 2022-12-19

## 2022-12-19 ENCOUNTER — WEB ENCOUNTER (OUTPATIENT)
Dept: URBAN - METROPOLITAN AREA CLINIC 98 | Facility: CLINIC | Age: 62
End: 2022-12-19

## 2022-12-19 VITALS
BODY MASS INDEX: 26.67 KG/M2 | HEART RATE: 90 BPM | SYSTOLIC BLOOD PRESSURE: 142 MMHG | TEMPERATURE: 97.2 F | DIASTOLIC BLOOD PRESSURE: 93 MMHG | WEIGHT: 176 LBS | HEIGHT: 68 IN

## 2022-12-19 DIAGNOSIS — R19.7 DIARRHEA: ICD-10-CM

## 2022-12-19 DIAGNOSIS — R10.9 ABDOMINAL PAIN: ICD-10-CM

## 2022-12-19 DIAGNOSIS — K51.00 CHRONIC ULCERATIVE COLITIS: ICD-10-CM

## 2022-12-19 DIAGNOSIS — Z79.899 IMMUNOSUPPRESSION DUE TO DRUG THERAPY: ICD-10-CM

## 2022-12-19 DIAGNOSIS — K51.00 ULCERATIVE COLITIS: ICD-10-CM

## 2022-12-19 PROBLEM — 444546002 CHRONIC ULCERATIVE COLITIS: Status: ACTIVE | Noted: 2022-06-28

## 2022-12-19 PROCEDURE — 99214 OFFICE O/P EST MOD 30 MIN: CPT | Performed by: INTERNAL MEDICINE

## 2022-12-19 RX ORDER — PREDNISONE 5 MG/1
TAKE TWO TABLETS BY MOUTH DAILY TABLET ORAL
Qty: 60 | Refills: 4 | Status: ACTIVE | COMMUNITY

## 2022-12-19 RX ORDER — AZATHIOPRINE 50 1/1
TAKE FOUR TABLETS BY MOUTH DAILY AS DIRECTED TABLET ORAL
Qty: 120 TABLET | Refills: 5 | Status: ACTIVE | COMMUNITY

## 2022-12-19 RX ORDER — ERGOCALCIFEROL CAPSULES, 1.25 MG/1
TAKE ONE CAPSULE BY MOUTH ONCE WEEKLY CAPSULE ORAL
Qty: 4 CAPSULE | Refills: 0 | Status: ACTIVE | COMMUNITY

## 2022-12-19 RX ORDER — ERGOCALCIFEROL CAPSULES, 1.25 MG/1
TAKE ONE CAPSULE BY MOUTH ONCE WEEKLY CAPSULE ORAL
Qty: 4 CAPSULE | Refills: 0 | OUTPATIENT

## 2022-12-19 RX ORDER — SULFASALAZINE 500 MG/1
TAKE 3 TABLETS 3 TIMES A DAY TABLET ORAL
Qty: 270 TABLETS | Refills: 5 | Status: ACTIVE | COMMUNITY

## 2022-12-19 RX ORDER — PREDNISONE 1 MG/1
TAKE 2 TABLETS BY MOUTH WITH FOOD OR MILK DAILY TABLET ORAL
Qty: 60 TABLET | Refills: 5 | Status: ACTIVE | COMMUNITY

## 2022-12-19 NOTE — HPI-TODAY'S VISIT:
63 yo nmale with UC comes in for 6 month f/u vis8it. Doing well. No pain diarrhea or bleeding. Has gas trapping in the left side and gets left sided pain. Longest it lasts might be a few hours. Happens less than 5 times a month.  Takes 1 mg prednisone every day. may take another .5 or 1 mg, never more No CT scans in last couple years. Gas goes away if he takes a little more prednisone. AGWS. No fcs.  No EIM, except shoulders, non-IBD.  Colonoscopy in July showed serrated adenoma that was removed. Gets intermittent heartburn. will recheck colon for dysplasia and for serrated adenoma June 2023.  Has taken sulfasalazine since his dx.  Has gone from 12 per day to 8 per day and suggest trying to d/talia Jan, decrease by 2 per week. Never had UC related joint aches. May want to try to d/c it and aza which he takes 2 aza in am and 1.5 at night and will consolidate to 3.5 q am. May want to d/c both and switch to Rinvoq.  ===================== 5/12/22  63 yo male doing well for UC. 1 year f/u. Takes 1 mg prednison and may go up to 2 mg a day. No ER visits. 2 doses Covid vax.     ========================= 5/25/21  60 yo male with UC comes in for 1.5 year f/u. No diarrhea bleeding or pain.   Doing well.  Had colonoscopy 2/2020 and no f/u since then.  2/2020 colon showed left sided/desc colon inflammation and focal ulceration desc colon.  On pred 2.5 a day. He feels it keeps him stable. Scared to go off it Occasional takes more than 2.5, less than once a month.  On azathioprine 2 in the am, 1.5 at night. Sulfasalazine 3 bid, was up to 6 bid, but they are hard to find.  No colon for a year./2/2020.   ======================= 11/13/19   History Of Present Illness  This is a scheduled appointment for this patient, a 59 year old /White male, after a previous visit on 11/13/2019, for a follow-up evaluation of Ulcerative colitis.     60 yo male with UC comes in for 3 month f/u.  Was on aza 200 mg q am.  Low wbc and hi 6MMP/nl lft, and changed to 100 mg bid.  Still on 2.5 mg pred a day.  Last time he took 5 mg was 2 months ago with travel.  As a precaution.  No pain, diarrhea or bleeding.  Stools are solid.  Last colonoscopy 6/11/19 and will do Jan Feb 2019.      ====== 8/14/19  60 yo male comes in on aza 200 mg a day.  Not doing too bad.  Gets abd pain non-daily.  On 2.5 mg pred a day and 5 mg if pain.  3 stools a day  Blood infrequent.  Abd pain is better since colonoscopy.  Gas trapping symptoms are better.  Labs from July 2019 are wnl ex borderline low wbc and hi 6 MMP 20,000 with nl lft.  ========== 6/11/19  60 yo male with UC comes in for 1 year f/u.  Now on aza 200 mg a day.  Was on Simponi 1 year ago.  Also tried Xeljanz in past year but it did not help.  Takes prednisone 5 mg a day for a few days and then 2.5 mg a day.  Gets gas in colon and it won't come out.  Gas trapping.    ==== June 2019  57 yo male with UC on Simponi 100 mg q month comes in for q 2 month f/u.  On aza 4 fbnkp=204 mg a day.  Doing well with no pain , no diarrhea, 4 stools a day, no bleeding.  AGWS.  No fcx.  No EIM ex joint = shoulders.  Stuck on pred 7.5 per day and can' t get below for more than a few day. ========================================= 4/12/18 57 yo male comes in with [place] for this 3 month visit.  Has been on 2.5 mg prednisone for a few months and may have to increase to 5-10 mg for a day or two.  For a month or two skipped the steroids for a month or two.  Saw an orhtopedist who says your bone looks good and said there was a little arthritis on his hip.  He said he was not worried about his shoulders.  He was scared to take any prednisone.  His UC flared up, and he started on the prednisone again.  He took 10 mg a day and  took pred 10 and today 7.5mg.  Since end of month he has been tapering.  Got 3 Simponi from me and getting 2 per month.  Decreased aza from 4 to 3 per day due to wbc being low.  started Simponi inearly 2017  ?? past vedo  Currently 6-7 stools a day No blood Colon 12/17  will try a gradual taper of pred      Past Medical History  Disease Name Date Onset Notes  Abdominal pain 9/8/2017 --   Anemia unk --   Diarrhea 04/12/2018 --   Immunosuppression due to drug therapy 06/16/2019 --   Ulcerative colitis unk --       Past Surgical History  Procedure Name Date Notes  Colonoscopy 12/2017 11/13/2019 - 06/14/2018 - 04/12/2018 -   flexible sigmoidoscopy 2013 11/13/2019 - 04/12/2018 -   Vasectomy unk --       Medication List  Name Date Started Instructions  azathioprine 50 mg oral tablet 06/11/2019 take 4 tablets by oral route qd for 90 days  Cipro 500 mg oral tablet 10/06/2017 TAKE ONE TABLET BY MOUTH TWICE A DAY  prednisone 5 mg oral tablet 07/24/2019 TAKE TWO TABLETS BY MOUTH DAILY  sulfasalazine 500 mg oral tablet 06/11/2019 TAKE FOUR TABLETS BY MOUTH THREE TIMES A DAY for 90 days  Sulfazine  mg oral tablet,delayed release (DR/EC) 10/27/2015 TAKE FOUR TABLETS BY MOUTH THREE TIMES A DAY for 90 days  Uceris 9 mg oral tablet,delayed and ext.release 03/21/2017 take 1 tablet (9 mg) by oral route once daily in the morning swallowing whole with water.  Xeljanz 5 mg oral tablet 06/14/2018 take 2 tablets by oral route 2 times a day for 30 days      Allergy List  Allergen Name Date Reaction Notes  No Known Environmental Allergies --  --  --   No Known Food Allergies --  --  --   PENICILLINS --  --  --       Family Medical History  Disease Name Relative/Age Notes  Family history of diabetes Father/  --       Social History  Finding Status Start/Stop Quantity Notes  Alcohol Never --/-- --  11/13/2019 - 08/14/2019 - 06/06/2019 - 06/14/2018 - 04/12/2018 - 01/12/2018 - 09/08/2017 - 07/07/2017 - 03/06/2017 -   Denies illicit substance abuse --  --/-- --  11/13/2019 -   Tobacco Former --/-- --  11/13/2019 - 06/06/2019 - 06/14/2018 - 04/12/2018 - 01/12/2018 - 09/08/2017 - 07/07/2017 - 03/06/2017 - 09/02/2016 -       Review of Systems  ConstitutionalDenies : body aches, chills, fatigue, fever, loss of appetite, malaise, night sweats, weight gain, weight loss  EyesDenies : blurred vision, visual changes  HENTDenies : Ear Pain/Ringing, hearing loss, Mouth Ulcers/Sores, nose bleeds, problems with gums/teeth, trouble swallowing  CardiovascularDenies : chest pain, leaky heart valves, heart murmur, heart racing/skipping, high blood pressure, palpitations  RespiratoryDenies : chronic cough, shortness of breath, wheezing or asthma symptoms  GastrointestinalDenies : Abdominal Pain/discomfort, Anal/Rectal Pain or Itching, Anal Spasm, Black Stool, Bloating/belching/gaseouness, Change of Bowel Habit, Constipation, Diarrhea/Loose Stool, Difficulty in Swallowing, Heartburn/esophageal reflux, Hemorrhoids, Indigestion, Mucus in Stool, Nausea/vomiting, Rectal Bleeding, Unintentional Weight Loss  GenitourinaryDenies : Blood in Urine, Burning/pain with Urination, frequency, Recent/frequent UTI, Kidney Stones  IntegumentDenies : itching/dry skin, jaundice, rashes, bumps or sores  NeurologicDenies : headaches, dizziness/vertigo, head trauma/injury, recent numbness/weakness, seizures  MusculoskeletalDenies : back pain, decreased range of motion, joint pain/arthritis, Problems Walking/calf or leg pain  EndocrineDenies : bruise easily, excessive thirst, heat/cold intolerance, history of high or low blood sugar  PsychiatricDenies : anxiety, changes in sleep pattern, depression, loss of memory  Heme-LymphDenies : Bleeding Problems, Enlarged Nodes/Swolen Glands, excessive bruising, history of anemia  Allergic-ImmunologicDenies : seasonal allergies    Vitals  Date Time BP Position Site L\R Cuff Size HR RR TEMP (F) WT  HT  BMI kg/m2 BSA m2 O2 Sat HC     11/13/2019 03:26 /83 Sitting    72 - R  97.4 181lbs 2oz 5'  8" 27.54 1.99        Physical Examination  ConstitutionalAppearance : Well nourished, well developed patient in no distress. Ambulating without difficulty.  Ability to Communicate : Normal communication ability  EyesConjunctivae and Eyelids : Conjunctivae and eyelids appear normal  Sclerae : White without injection  HENTHead :  Inspection : Normocephalic and atraumatic  Face :  Inspection : Face within normal limits  Ears :  External Ears : External ears within normal limits  Nose/Nasopharynx :  External Nose : External nose normal appearance, nares patent, no nasal discharge  Mouth and Throat :  General : Oral cavity appearance normal, breath odor normal  Lips : Appearance normal  NeckInspection and Palpation : Normal appearance without tenderness on palpation or deformities, trachea midline  Thyroid : Normal size without tenderness, nodules or masses  Jugular Veins : no JVD  ChestInspection of Chest : No lesions, deformities or traumatic injuries present. No significant scars are present.  Respiratory Effort : Breathing is unlabored without accessory muscle use  Palpation of Chest : Chest wall non-tender with no masses present. Tactile fremitus is normal  Auscultation : Normal breath sounds  CardiovascularHeart :  Auscultation : Heart rate is regular with normal rhythm. No murmurs are heard. No edema.  GastrointestinalAbdominal Exam : Abdomen soft without rigidity or guarding, abdomen non-tender to palpation, normal bowel sounds, no masses present, non-distended  Liver and Spleen : No hepatomegaly present. Liver is non-tender to palpation and spleen is not palpable.  LymphaticNeck : No lymphadenopathy present  Axillae : No lymphadenopathy present  Groin : No lymphadenopathy present  MusculoskeletalGait and Station : Normal Gait, normal station  Neck/Spine/Pelvis : No tenderness of deformities present.  SkinGeneral Inspection : No rashes, lesions or areas of discoloration present. Skin turgor is normal.  General Palpation : No abnormalities, masses or tenderness on palpation.  Neurologic and PsychiatricOrientation : Oriented to person, place and time  Sensation : Normal sensation  Memory : Short and long term memory intact.  Mood and Affect : Normal mood with an appropriate affect      Assessment  Ulcerative colitis     556.6  Immunosuppression due to drug therapy     V58.69/Z79.899  Abdominal pain     789.00/R10.9    Plan  OrdersPatient not identified as an unhealthy alcohol user when screene () - - 11/13/2019  Influenza immunization administered or previously received () - - 11/13/2019  BMI is documented within normal parameters and no follow-up plan () - - 11/13/2019  Current tobacco non-user (CAD, cap, COPD, PV) (dm) (IBD) (1036F, ) - - 11/13/2019  Colorectal cancer screening results documented and reviewed (PV) (3017F) - - 11/13/2019  Documentation of current medications. () - - 11/13/2019  CMP (comprehensive metabolic panel) (63786) - - 11/13/2019  Sed rate (79307) - - 11/13/2019  C-reactive protein (76766) - - 11/13/2019  CBC with diff (18083) - - 11/13/2019  Thiopurine metabolites measurement. (02342, 78637) - - 11/13/2019  MedicationsMedications have been Reconciled  Transition of Care or Provider Policy  InstructionsI have documented a list of current medications and reviewed it with the patient.  I encouraged the patient to diet and exercise.  Electronically Identified Patient Education Materials Provided Electronically  DispositionReturn To Clinic in 6 Months  CorrespondenceCC this document (Arturo Velázquez MD) - 11/13/2019    Try stopping pred or taper by 1 mg  consdier uceris if needed.  ck labs and thiopurines.  Colonoscopy in 2-3 months.  he mentions on antibiotic "clindamycin" for "wound" and take a probiotic  Gave him 12 days of bid vsl3 probiotic  Addendum--11/25/19 6TG 405 and 6MMP of 15805 Will reduce aza from 100 bid to 100 in am and 75 in pm and recheck labs in 1 month         Electronically Signed by: Gerardo Zabala MD -Author on November 25, 2019 02:15:43 AM

## 2022-12-19 NOTE — PHYSICAL EXAM RECTAL:
normal tone, no external hemorrhoids, no masses palpable, no red blood, Tenderness on TAM, Internal hemorrhoids present

## 2022-12-28 LAB
CALPROTECTIN, FECAL: 19
LACTOFERRIN, FECAL, QUANT.: 3.09

## 2022-12-31 LAB
6-MMPN: 2748
6-TGN: 69
A/G RATIO: 1.7
ALBUMIN: 4.3
ALKALINE PHOSPHATASE: 66
ALT (SGPT): 9
AST (SGOT): 22
BASO (ABSOLUTE): 0
BASOS: 1
BILIRUBIN, TOTAL: 0.5
BUN/CREATININE RATIO: 18
BUN: 14
C-REACTIVE PROTEIN, QUANT: 1
CALCIUM: 9.5
CARBON DIOXIDE, TOTAL: 27
CHLORIDE: 102
CREATININE: 0.79
EGFR: 99
EOS (ABSOLUTE): 0.1
EOS: 2
FERRITIN, SERUM: 130
FOLATE (FOLIC ACID), SERUM: >20
GLOBULIN, TOTAL: 2.6
GLUCOSE: 105
HEMATOCRIT: 49.8
HEMATOLOGY COMMENTS:: (no result)
HEMOGLOBIN: 16.6
IMMATURE CELLS: (no result)
IMMATURE GRANS (ABS): 0
IMMATURE GRANULOCYTES: 0
IRON BIND.CAP.(TIBC): 293
IRON SATURATION: 36
IRON: 106
LYMPHS (ABSOLUTE): 0.9
LYMPHS: 19
Lab: (no result)
MCH: 32.1
MCHC: 33.3
MCV: 96
MONOCYTES(ABSOLUTE): 0.7
MONOCYTES: 14
NEUTROPHILS (ABSOLUTE): 2.9
NEUTROPHILS: 64
NRBC: (no result)
PLATELETS: 186
POTASSIUM: 4.6
PROTEIN, TOTAL: 6.9
RBC: 5.17
RDW: 13.5
SEDIMENTATION RATE-WESTERGREN: 7
SODIUM: 143
UIBC: 187
VITAMIN B12: 348
VITAMIN D, 25-HYDROXY: 77.8
WBC: 4.6

## 2023-01-12 ENCOUNTER — ERX REFILL RESPONSE (OUTPATIENT)
Dept: URBAN - METROPOLITAN AREA CLINIC 98 | Facility: CLINIC | Age: 63
End: 2023-01-12

## 2023-01-12 RX ORDER — ERGOCALCIFEROL CAPSULES, 1.25 MG/1
TAKE ONE CAPSULE BY MOUTH ONCE WEEKLY CAPSULE ORAL
Qty: 4 CAPSULE | Refills: 0 | OUTPATIENT

## 2023-01-23 ENCOUNTER — ERX REFILL RESPONSE (OUTPATIENT)
Dept: URBAN - METROPOLITAN AREA CLINIC 98 | Facility: CLINIC | Age: 63
End: 2023-01-23

## 2023-01-23 RX ORDER — AZATHIOPRINE 50 1/1
TAKE FOUR TABLETS BY MOUTH DAILY AS DIRECTED TABLET ORAL
Qty: 120 TABLET | Refills: 5 | OUTPATIENT

## 2023-01-23 RX ORDER — AZATHIOPRINE 50 MG/1
TAKE FOUR TABLETS BY MOUTH DAILY TABLET ORAL
Qty: 120 TABLET | Refills: 0 | OUTPATIENT

## 2023-02-15 ENCOUNTER — ERX REFILL RESPONSE (OUTPATIENT)
Dept: URBAN - METROPOLITAN AREA CLINIC 98 | Facility: CLINIC | Age: 63
End: 2023-02-15

## 2023-02-15 RX ORDER — AZATHIOPRINE 50 MG/1
TAKE FOUR TABLETS BY MOUTH DAILY TABLET ORAL
Qty: 120 TABLET | Refills: 0 | OUTPATIENT

## 2023-03-20 ENCOUNTER — TELEPHONE ENCOUNTER (OUTPATIENT)
Dept: URBAN - METROPOLITAN AREA CLINIC 98 | Facility: CLINIC | Age: 63
End: 2023-03-20

## 2023-03-20 RX ORDER — AZATHIOPRINE 50 MG/1
TAKE FOUR TABLETS BY MOUTH DAILY TABLET ORAL
Qty: 120 TABLET | Refills: 5

## 2023-05-24 ENCOUNTER — P2P PATIENT RECORD (OUTPATIENT)
Age: 63
End: 2023-05-24

## 2023-07-05 ENCOUNTER — LAB OUTSIDE AN ENCOUNTER (OUTPATIENT)
Dept: URBAN - METROPOLITAN AREA CLINIC 98 | Facility: CLINIC | Age: 63
End: 2023-07-05

## 2023-07-05 ENCOUNTER — DASHBOARD ENCOUNTERS (OUTPATIENT)
Age: 63
End: 2023-07-05

## 2023-07-05 ENCOUNTER — OFFICE VISIT (OUTPATIENT)
Dept: URBAN - METROPOLITAN AREA CLINIC 98 | Facility: CLINIC | Age: 63
End: 2023-07-05
Payer: COMMERCIAL

## 2023-07-05 VITALS — WEIGHT: 176 LBS | BODY MASS INDEX: 26.67 KG/M2 | HEIGHT: 68 IN

## 2023-07-05 DIAGNOSIS — Z79.899 IMMUNOSUPPRESSION DUE TO DRUG THERAPY: ICD-10-CM

## 2023-07-05 DIAGNOSIS — K51.00 CHRONIC ULCERATIVE COLITIS: ICD-10-CM

## 2023-07-05 DIAGNOSIS — R19.7 DIARRHEA: ICD-10-CM

## 2023-07-05 DIAGNOSIS — R10.9 ABDOMINAL PAIN: ICD-10-CM

## 2023-07-05 PROCEDURE — 99214 OFFICE O/P EST MOD 30 MIN: CPT | Performed by: INTERNAL MEDICINE

## 2023-07-05 RX ORDER — PREDNISONE 5 MG/1
TAKE TWO TABLETS BY MOUTH DAILY TABLET ORAL
Qty: 60 | Refills: 4 | Status: ACTIVE | COMMUNITY

## 2023-07-05 RX ORDER — AZATHIOPRINE 50 MG/1
TAKE FOUR TABLETS BY MOUTH DAILY TABLET ORAL
Qty: 120 TABLET | Refills: 5 | Status: ACTIVE | COMMUNITY

## 2023-07-05 RX ORDER — PREDNISONE 1 MG/1
TAKE 2 TABLETS BY MOUTH WITH FOOD OR MILK DAILY TABLET ORAL
Qty: 60 TABLET | Refills: 5 | Status: ACTIVE | COMMUNITY

## 2023-07-05 RX ORDER — ERGOCALCIFEROL CAPSULES, 1.25 MG/1
TAKE ONE CAPSULE BY MOUTH ONCE WEEKLY CAPSULE ORAL
Qty: 4 CAPSULE | Refills: 0 | Status: ACTIVE | COMMUNITY

## 2023-07-05 RX ORDER — SULFASALAZINE 500 MG/1
TAKE 3 TABLETS 3 TIMES A DAY TABLET ORAL
Qty: 270 TABLETS | Refills: 5 | Status: ACTIVE | COMMUNITY

## 2023-07-05 NOTE — PHYSICAL EXAM NEUROLOGIC:
oriented to person, place and time, normal sensation, short and long term memory intact, sensory exam intact SCD

## 2023-07-05 NOTE — HPI-TODAY'S VISIT:
62 yo male with severe UC comes in for 8 month f/u.  Doing well. Has decreased the azathioprine, down to  50 mg aza per day,--- for 2-3 months or 6 months sulfasal 3 q day.- suggest increasing to 3 bid or 6 bid folic acid - not been Prednisone .5. or 1 mg a day.  No flares ---  3 stools a day in thje am no blood no pain --- just gas trapped ----  ============================ 1/19/22  61 yo nmale with UC comes in for 6 month f/u vis8it. Doing well. No pain diarrhea or bleeding. Has gas trapping in the left side and gets left sided pain. Longest it lasts might be a few hours. Happens less than 5 times a month.  Takes 1 mg prednisone every day. may take another .5 or 1 mg, never more No CT scans in last couple years. Gas goes away if he takes a little more prednisone. AGWS. No fcs.  No EIM, except shoulders, non-IBD.  Colonoscopy in July showed serrated adenoma that was removed. Gets intermittent heartburn. will recheck colon for dysplasia and for serrated adenoma June 2023.  Has taken sulfasalazine since his dx.  Has gone from 12 per day to 8 per day and suggest trying to d/talia Jan, decrease by 2 per week. Never had UC related joint aches. May want to try to d/c it and aza which he takes 2 aza in am and 1.5 at night and will consolidate to 3.5 q am. May want to d/c both and switch to Rinvoq.  ===================== 5/12/22  61 yo male doing well for UC. 1 year f/u. Takes 1 mg prednison and may go up to 2 mg a day. No ER visits. 2 doses Covid vax.     ========================= 5/25/21  60 yo male with UC comes in for 1.5 year f/u. No diarrhea bleeding or pain.   Doing well.  Had colonoscopy 2/2020 and no f/u since then.  2/2020 colon showed left sided/desc colon inflammation and focal ulceration desc colon.  On pred 2.5 a day. He feels it keeps him stable. Scared to go off it Occasional takes more than 2.5, less than once a month.  On azathioprine 2 in the am, 1.5 at night. Sulfasalazine 3 bid, was up to 6 bid, but they are hard to find.  No colon for a year./2/2020.   ======================= 11/13/19   History Of Present Illness  This is a scheduled appointment for this patient, a 59 year old /White male, after a previous visit on 11/13/2019, for a follow-up evaluation of Ulcerative colitis.     60 yo male with UC comes in for 3 month f/u.  Was on aza 200 mg q am.  Low wbc and hi 6MMP/nl lft, and changed to 100 mg bid.  Still on 2.5 mg pred a day.  Last time he took 5 mg was 2 months ago with travel.  As a precaution.  No pain, diarrhea or bleeding.  Stools are solid.  Last colonoscopy 6/11/19 and will do Jan Feb 2019.      ====== 8/14/19  60 yo male comes in on aza 200 mg a day.  Not doing too bad.  Gets abd pain non-daily.  On 2.5 mg pred a day and 5 mg if pain.  3 stools a day  Blood infrequent.  Abd pain is better since colonoscopy.  Gas trapping symptoms are better.  Labs from July 2019 are wnl ex borderline low wbc and hi 6 MMP 20,000 with nl lft.  ========== 6/11/19  60 yo male with UC comes in for 1 year f/u.  Now on aza 200 mg a day.  Was on Simponi 1 year ago.  Also tried Xeljanz in past year but it did not help.  Takes prednisone 5 mg a day for a few days and then 2.5 mg a day.  Gets gas in colon and it won't come out.  Gas trapping.    ==== June 2019  59 yo male with UC on Simponi 100 mg q month comes in for q 2 month f/u.  On aza 4 srbcx=708 mg a day.  Doing well with no pain , no diarrhea, 4 stools a day, no bleeding.  AGWS.  No fcx.  No EIM ex joint = shoulders.  Stuck on pred 7.5 per day and can' t get below for more than a few day. ========================================= 4/12/18 59 yo male comes in with [place] for this 3 month visit.  Has been on 2.5 mg prednisone for a few months and may have to increase to 5-10 mg for a day or two.  For a month or two skipped the steroids for a month or two.  Saw an orhtopedist who says your bone looks good and said there was a little arthritis on his hip.  He said he was not worried about his shoulders.  He was scared to take any prednisone.  His UC flared up, and he started on the prednisone again.  He took 10 mg a day and  took pred 10 and today 7.5mg.  Since end of month he has been tapering.  Got 3 Simponi from me and getting 2 per month.  Decreased aza from 4 to 3 per day due to wbc being low.  started Simponi inearly 2017  ?? past vedo  Currently 6-7 stools a day No blood Colon 12/17  will try a gradual taper of pred      Past Medical History  Disease Name Date Onset Notes  Abdominal pain 9/8/2017 --   Anemia unk --   Diarrhea 04/12/2018 --   Immunosuppression due to drug therapy 06/16/2019 --   Ulcerative colitis unk --       Past Surgical History  Procedure Name Date Notes  Colonoscopy 12/2017 11/13/2019 - 06/14/2018 - 04/12/2018 -   flexible sigmoidoscopy 2013 11/13/2019 - 04/12/2018 -   Vasectomy unk --       Medication List  Name Date Started Instructions  azathioprine 50 mg oral tablet 06/11/2019 take 4 tablets by oral route qd for 90 days  Cipro 500 mg oral tablet 10/06/2017 TAKE ONE TABLET BY MOUTH TWICE A DAY  prednisone 5 mg oral tablet 07/24/2019 TAKE TWO TABLETS BY MOUTH DAILY  sulfasalazine 500 mg oral tablet 06/11/2019 TAKE FOUR TABLETS BY MOUTH THREE TIMES A DAY for 90 days  Sulfazine  mg oral tablet,delayed release (DR/EC) 10/27/2015 TAKE FOUR TABLETS BY MOUTH THREE TIMES A DAY for 90 days  Uceris 9 mg oral tablet,delayed and ext.release 03/21/2017 take 1 tablet (9 mg) by oral route once daily in the morning swallowing whole with water.  Xeljanz 5 mg oral tablet 06/14/2018 take 2 tablets by oral route 2 times a day for 30 days      Allergy List  Allergen Name Date Reaction Notes  No Known Environmental Allergies --  --  --   No Known Food Allergies --  --  --   PENICILLINS --  --  --       Family Medical History  Disease Name Relative/Age Notes  Family history of diabetes Father/  --       Social History  Finding Status Start/Stop Quantity Notes  Alcohol Never --/-- --  11/13/2019 - 08/14/2019 - 06/06/2019 - 06/14/2018 - 04/12/2018 - 01/12/2018 - 09/08/2017 - 07/07/2017 - 03/06/2017 -   Denies illicit substance abuse --  --/-- --  11/13/2019 -   Tobacco Former --/-- --  11/13/2019 - 06/06/2019 - 06/14/2018 - 04/12/2018 - 01/12/2018 - 09/08/2017 - 07/07/2017 - 03/06/2017 - 09/02/2016 -       Review of Systems  ConstitutionalDenies : body aches, chills, fatigue, fever, loss of appetite, malaise, night sweats, weight gain, weight loss  EyesDenies : blurred vision, visual changes  HENTDenies : Ear Pain/Ringing, hearing loss, Mouth Ulcers/Sores, nose bleeds, problems with gums/teeth, trouble swallowing  CardiovascularDenies : chest pain, leaky heart valves, heart murmur, heart racing/skipping, high blood pressure, palpitations  RespiratoryDenies : chronic cough, shortness of breath, wheezing or asthma symptoms  GastrointestinalDenies : Abdominal Pain/discomfort, Anal/Rectal Pain or Itching, Anal Spasm, Black Stool, Bloating/belching/gaseouness, Change of Bowel Habit, Constipation, Diarrhea/Loose Stool, Difficulty in Swallowing, Heartburn/esophageal reflux, Hemorrhoids, Indigestion, Mucus in Stool, Nausea/vomiting, Rectal Bleeding, Unintentional Weight Loss  GenitourinaryDenies : Blood in Urine, Burning/pain with Urination, frequency, Recent/frequent UTI, Kidney Stones  IntegumentDenies : itching/dry skin, jaundice, rashes, bumps or sores  NeurologicDenies : headaches, dizziness/vertigo, head trauma/injury, recent numbness/weakness, seizures  MusculoskeletalDenies : back pain, decreased range of motion, joint pain/arthritis, Problems Walking/calf or leg pain  EndocrineDenies : bruise easily, excessive thirst, heat/cold intolerance, history of high or low blood sugar  PsychiatricDenies : anxiety, changes in sleep pattern, depression, loss of memory  Heme-LymphDenies : Bleeding Problems, Enlarged Nodes/Swolen Glands, excessive bruising, history of anemia  Allergic-ImmunologicDenies : seasonal allergies    Vitals  Date Time BP Position Site L\R Cuff Size HR RR TEMP (F) WT  HT  BMI kg/m2 BSA m2 O2 Sat HC     11/13/2019 03:26 /83 Sitting    72 - R  97.4 181lbs 2oz 5'  8" 27.54 1.99        Physical Examination  ConstitutionalAppearance : Well nourished, well developed patient in no distress. Ambulating without difficulty.  Ability to Communicate : Normal communication ability  EyesConjunctivae and Eyelids : Conjunctivae and eyelids appear normal  Sclerae : White without injection  HENTHead :  Inspection : Normocephalic and atraumatic  Face :  Inspection : Face within normal limits  Ears :  External Ears : External ears within normal limits  Nose/Nasopharynx :  External Nose : External nose normal appearance, nares patent, no nasal discharge  Mouth and Throat :  General : Oral cavity appearance normal, breath odor normal  Lips : Appearance normal  NeckInspection and Palpation : Normal appearance without tenderness on palpation or deformities, trachea midline  Thyroid : Normal size without tenderness, nodules or masses  Jugular Veins : no JVD  ChestInspection of Chest : No lesions, deformities or traumatic injuries present. No significant scars are present.  Respiratory Effort : Breathing is unlabored without accessory muscle use  Palpation of Chest : Chest wall non-tender with no masses present. Tactile fremitus is normal  Auscultation : Normal breath sounds  CardiovascularHeart :  Auscultation : Heart rate is regular with normal rhythm. No murmurs are heard. No edema.  GastrointestinalAbdominal Exam : Abdomen soft without rigidity or guarding, abdomen non-tender to palpation, normal bowel sounds, no masses present, non-distended  Liver and Spleen : No hepatomegaly present. Liver is non-tender to palpation and spleen is not palpable.  LymphaticNeck : No lymphadenopathy present  Axillae : No lymphadenopathy present  Groin : No lymphadenopathy present  MusculoskeletalGait and Station : Normal Gait, normal station  Neck/Spine/Pelvis : No tenderness of deformities present.  SkinGeneral Inspection : No rashes, lesions or areas of discoloration present. Skin turgor is normal.  General Palpation : No abnormalities, masses or tenderness on palpation.  Neurologic and PsychiatricOrientation : Oriented to person, place and time  Sensation : Normal sensation  Memory : Short and long term memory intact.  Mood and Affect : Normal mood with an appropriate affect      Assessment  Ulcerative colitis     556.6  Immunosuppression due to drug therapy     V58.69/Z79.899  Abdominal pain     789.00/R10.9    Plan  OrdersPatient not identified as an unhealthy alcohol user when screene () - - 11/13/2019  Influenza immunization administered or previously received () - - 11/13/2019  BMI is documented within normal parameters and no follow-up plan () - - 11/13/2019  Current tobacco non-user (CAD, cap, COPD, PV) (dm) (IBD) (1036F, ) - - 11/13/2019  Colorectal cancer screening results documented and reviewed (PV) (3017F) - - 11/13/2019  Documentation of current medications. () - - 11/13/2019  CMP (comprehensive metabolic panel) (26296) - - 11/13/2019  Sed rate (75372) - - 11/13/2019  C-reactive protein (21773) - - 11/13/2019  CBC with diff (20281) - - 11/13/2019  Thiopurine metabolites measurement. (86635, 50478) - - 11/13/2019  MedicationsMedications have been Reconciled  Transition of Care or Provider Policy  InstructionsI have documented a list of current medications and reviewed it with the patient.  I encouraged the patient to diet and exercise.  Electronically Identified Patient Education Materials Provided Electronically  DispositionReturn To Clinic in 6 Months  CorrespondenceCC this document (Arturo Velázquez MD) - 11/13/2019    Try stopping pred or taper by 1 mg  consdier uceris if needed.  ck labs and thiopurines.  Colonoscopy in 2-3 months.  he mentions on antibiotic "clindamycin" for "wound" and take a probiotic  Gave him 12 days of bid vsl3 probiotic  Addendum--11/25/19 6TG 405 and 6MMP of 26481 Will reduce aza from 100 bid to 100 in am and 75 in pm and recheck labs in 1 month         Electronically Signed by: Gerardo Zabala MD -Author on November 25, 2019 02:15:43 AM

## 2023-07-10 LAB
6-MMPN: 340
6-TGN: 95
A/G RATIO: 1.9
ALBUMIN: 4.2
ALKALINE PHOSPHATASE: 56
ALT (SGPT): 14
AST (SGOT): 27
BASO (ABSOLUTE): 0
BASOS: 0
BILIRUBIN, TOTAL: 0.4
BUN/CREATININE RATIO: 18
BUN: 16
C-REACTIVE PROTEIN, QUANT: <1
CALCIUM: 9.1
CARBON DIOXIDE, TOTAL: 24
CHLORIDE: 103
CREATININE: 0.88
EGFR: 95
EOS (ABSOLUTE): 0.1
EOS: 1
FERRITIN, SERUM: 70
FOLATE (FOLIC ACID), SERUM: >20
GLOBULIN, TOTAL: 2.2
GLUCOSE: 83
HEMATOCRIT: 48.5
HEMATOLOGY COMMENTS:: (no result)
HEMOGLOBIN: 16.4
IMMATURE CELLS: (no result)
IMMATURE GRANS (ABS): 0
IMMATURE GRANULOCYTES: 0
IRON BIND.CAP.(TIBC): 324
IRON SATURATION: 29
IRON: 94
LYMPHS (ABSOLUTE): 0.8
LYMPHS: 15
Lab: (no result)
MCH: 32.1
MCHC: 33.8
MCV: 95
MONOCYTES(ABSOLUTE): 0.7
MONOCYTES: 12
NEUTROPHILS (ABSOLUTE): 3.8
NEUTROPHILS: 72
NRBC: (no result)
PLATELETS: 165
POTASSIUM: 3.8
PROTEIN, TOTAL: 6.4
RBC: 5.11
RDW: 12.3
SEDIMENTATION RATE-WESTERGREN: 8
SODIUM: 144
UIBC: 230
VITAMIN B12: 443
VITAMIN D, 25-HYDROXY: 38
WBC: 5.4

## 2023-07-19 LAB
CALPROTECTIN, FECAL: 13
LACTOFERRIN, FECAL, QUANT.: 1.19

## 2023-09-12 ENCOUNTER — OFFICE VISIT (OUTPATIENT)
Dept: URBAN - METROPOLITAN AREA SURGERY CENTER 18 | Facility: SURGERY CENTER | Age: 63
End: 2023-09-12
Payer: COMMERCIAL

## 2023-09-12 ENCOUNTER — CLAIMS CREATED FROM THE CLAIM WINDOW (OUTPATIENT)
Dept: URBAN - METROPOLITAN AREA CLINIC 4 | Facility: CLINIC | Age: 63
End: 2023-09-12
Payer: COMMERCIAL

## 2023-09-12 DIAGNOSIS — Z12.11 COLON CANCER SCREENING (HIGH RISK): ICD-10-CM

## 2023-09-12 DIAGNOSIS — K51.00 ACUTE ULCERATIVE PANCOLITIS: ICD-10-CM

## 2023-09-12 DIAGNOSIS — D12.8 ADENOMATOUS POLYP OF RECTUM: ICD-10-CM

## 2023-09-12 DIAGNOSIS — K31.89 OTHER DISEASES OF STOMACH AND DUODENUM: ICD-10-CM

## 2023-09-12 DIAGNOSIS — C20 ADENOCARCINOMA OF RECTAL AMPULLA: ICD-10-CM

## 2023-09-12 DIAGNOSIS — K62.9 LESION OF RECTUM: ICD-10-CM

## 2023-09-12 PROCEDURE — 45380 COLONOSCOPY AND BIOPSY: CPT | Performed by: INTERNAL MEDICINE

## 2023-09-12 PROCEDURE — 88305 TISSUE EXAM BY PATHOLOGIST: CPT | Performed by: PATHOLOGY

## 2023-09-12 PROCEDURE — 00811 ANES LWR INTST NDSC NOS: CPT | Performed by: NURSE ANESTHETIST, CERTIFIED REGISTERED

## 2023-09-12 PROCEDURE — G8907 PT DOC NO EVENTS ON DISCHARG: HCPCS | Performed by: INTERNAL MEDICINE

## 2023-09-12 RX ORDER — SULFASALAZINE 500 MG/1
TAKE 3 TABLETS 3 TIMES A DAY TABLET ORAL
Qty: 270 TABLETS | Refills: 5 | Status: ACTIVE | COMMUNITY

## 2023-09-12 RX ORDER — PREDNISONE 1 MG/1
TAKE 2 TABLETS BY MOUTH WITH FOOD OR MILK DAILY TABLET ORAL
Qty: 60 TABLET | Refills: 5 | Status: ACTIVE | COMMUNITY

## 2023-09-12 RX ORDER — ERGOCALCIFEROL CAPSULES, 1.25 MG/1
TAKE ONE CAPSULE BY MOUTH ONCE WEEKLY CAPSULE ORAL
Qty: 4 CAPSULE | Refills: 0 | Status: ACTIVE | COMMUNITY

## 2023-09-12 RX ORDER — PREDNISONE 5 MG/1
TAKE TWO TABLETS BY MOUTH DAILY TABLET ORAL
Qty: 60 | Refills: 4 | Status: ACTIVE | COMMUNITY

## 2023-09-12 RX ORDER — AZATHIOPRINE 50 MG/1
TAKE FOUR TABLETS BY MOUTH DAILY TABLET ORAL
Qty: 120 TABLET | Refills: 5 | Status: ACTIVE | COMMUNITY

## 2023-10-19 ENCOUNTER — P2P PATIENT RECORD (OUTPATIENT)
Age: 63
End: 2023-10-19

## 2024-03-06 ENCOUNTER — LAB (OUTPATIENT)
Dept: URBAN - METROPOLITAN AREA MEDICAL CENTER 10 | Facility: MEDICAL CENTER | Age: 64
End: 2024-03-06
Payer: COMMERCIAL

## 2024-03-06 ENCOUNTER — LAB (OUTPATIENT)
Dept: URBAN - METROPOLITAN AREA MEDICAL CENTER 10 | Facility: MEDICAL CENTER | Age: 64
End: 2024-03-06

## 2024-03-06 DIAGNOSIS — R93.3 ABN FINDINGS-GI TRACT: ICD-10-CM

## 2024-03-06 DIAGNOSIS — I81 PORTAL VEIN THROMBOSIS: ICD-10-CM

## 2024-03-06 DIAGNOSIS — D61.818 PANCYTOPENIA: ICD-10-CM

## 2024-03-06 PROCEDURE — 99253 IP/OBS CNSLTJ NEW/EST LOW 45: CPT | Performed by: INTERNAL MEDICINE

## 2024-03-06 PROCEDURE — 99221 1ST HOSP IP/OBS SF/LOW 40: CPT | Performed by: INTERNAL MEDICINE

## 2024-03-06 PROCEDURE — G8430 EC AT DOC MEDREC PT NOT ELIG: HCPCS | Performed by: INTERNAL MEDICINE

## 2024-03-06 PROCEDURE — 99254 IP/OBS CNSLTJ NEW/EST MOD 60: CPT | Performed by: INTERNAL MEDICINE
